# Patient Record
Sex: MALE | Race: WHITE | Employment: STUDENT | URBAN - METROPOLITAN AREA
[De-identification: names, ages, dates, MRNs, and addresses within clinical notes are randomized per-mention and may not be internally consistent; named-entity substitution may affect disease eponyms.]

---
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
## 2025-07-23 ENCOUNTER — HOSPITAL ENCOUNTER (OUTPATIENT)
Age: 20
Discharge: HOME | End: 2025-07-23
Payer: COMMERCIAL

## 2025-07-23 VITALS
SYSTOLIC BLOOD PRESSURE: 114 MMHG | OXYGEN SATURATION: 98 % | DIASTOLIC BLOOD PRESSURE: 72 MMHG | RESPIRATION RATE: 18 BRPM | TEMPERATURE: 98 F | HEART RATE: 74 BPM

## 2025-07-23 DIAGNOSIS — M25.522 LEFT ELBOW PAIN: Primary | ICD-10-CM

## 2025-07-23 PROCEDURE — 99213 OFFICE O/P EST LOW 20 MIN: CPT | Performed by: NURSE PRACTITIONER

## 2025-07-23 NOTE — ED PROVIDER NOTES
Patient Seen in: [facility]        History  Chief Complaint   Patient presents with    Elbow Pain     Stated Complaint: -elbow pain (L)    Subjective:   Patient is a 19-year-old male who presents today for left elbow pain, ongoing for over a month.  Patient plays baseball, he is a pitcher, about a month ago he injured himself, felt a pop in the elbow, went to an immediate care in [place], had an x-ray that was normal.  He was advised to get MRI of the elbow, but never did.  He reports he is here today because pain has not improved.  He denies numbness or tingling.  Has normal range of motion.        Objective:     No pertinent past medical history.            No pertinent past surgical history.              No pertinent social history.            Review of Systems   All other systems reviewed and are negative.      Positive for stated complaint: -elbow pain (L)  Other systems are as noted in HPI.  Constitutional and vital signs reviewed.      All other systems reviewed and negative except as noted above.                  Physical Exam    ED Triage Vitals [07/23/25 1143]   /72   Pulse 74   Resp 18   Temp 98.1 °F (36.7 °C)   Temp src Oral   SpO2 98 %   O2 Device None (Room air)       Current Vitals:   Vital Signs  BP: 114/72  Pulse: 74  Resp: 18  Temp: 98.1 °F (36.7 °C)  Temp src: Oral    Oxygen Therapy  SpO2: 98 %  O2 Device: None (Room air)            Physical Exam  Constitutional:       Appearance: Normal appearance.   Musculoskeletal:      Left upper arm: Normal.      Left elbow: Normal range of motion. Tenderness present in medial epicondyle.      Left forearm: Normal.      Left wrist: Normal. Normal pulse.      Left hand: Normal. Normal capillary refill. Normal pulse.   Neurological:      Mental Status: He is alert.         ED Course  Labs Reviewed - No data to display    MDM    Medical Decision Making  Differentials considered: left medial epicondylitis vs left elbow fracture vs  left elbow sprain vs other     Patient reports he had an elbow x-ray month ago that was normal.  Suspect medial epicondylitis however advised he follow-up with orthopedics, phone number provided.  Supportive care discussed.  Advised he avoid sports for now.  Patient verbalized understanding and agreeable to plan of care.             Disposition and Plan     Clinical Impression:  1. Left elbow pain         Disposition:  Discharge  7/23/2025 12:28 pm    Follow-up:  No follow-up provider specified.        Medications Prescribed:  There are no discharge medications for this patient.            Supplementary Documentation:

## 2025-07-23 NOTE — DISCHARGE INSTRUCTIONS
Ibuprofen 600 mg every 6 hours as needed for pain  Rest for now  Follow-up with orthopedics as discussed    To schedule an appointment with the Orthopedic and Sports Medicine department; please text or call [phone] and choose option 3 when prompted. If your insurance requires a referral, please contact your primary care provider first.

## 2025-07-23 NOTE — ED INITIAL ASSESSMENT (HPI)
Pt c/o left elbow pain + swelling for over a month sts that pain started after he pitch while playing baseball more than a month ago. Pt sts that he had xray to left elbow 2 weeks ago and was told that xray is normal

## (undated) NOTE — ED AVS SNAPSHOT
Yumi Daniels   MRN: [id number]    Department:  [facility] Emergency Department   Date of Visit:  7/4/2018           Disclosure     Insurance plans vary and the physician(s) referred by the ER may not be covered by your plan.  Please conta CARE PHYSICIAN AT ONCE OR RETURN IMMEDIATELY TO THE EMERGENCY DEPARTMENT. If you have been prescribed any medication(s), please fill your prescription right away and begin taking the medication(s) as directed.   If you believe that any of the medications

## (undated) NOTE — ED AVS SNAPSHOT
Anita Benson   MRN: [id number]    Department:  [facility] Emergency Department   Date of Visit:  10/11/2017           Disclosure     Insurance plans vary and the physician(s) referred by the ER may not be covered by your plan. CARE PHYSICIAN AT ONCE OR RETURN IMMEDIATELY TO THE EMERGENCY DEPARTMENT. If you have been prescribed any medication(s), please fill your prescription right away and begin taking the medication(s) as directed.   If you believe that any of the medications